# Patient Record
Sex: MALE | Race: ASIAN | URBAN - METROPOLITAN AREA
[De-identification: names, ages, dates, MRNs, and addresses within clinical notes are randomized per-mention and may not be internally consistent; named-entity substitution may affect disease eponyms.]

---

## 2023-12-28 ENCOUNTER — OFFICE VISIT (OUTPATIENT)
Facility: LOCATION | Age: 28
End: 2023-12-28
Payer: COMMERCIAL

## 2023-12-28 ENCOUNTER — LAB ENCOUNTER (OUTPATIENT)
Dept: LAB | Age: 28
End: 2023-12-28
Attending: INTERNAL MEDICINE
Payer: COMMERCIAL

## 2023-12-28 VITALS
OXYGEN SATURATION: 99 % | WEIGHT: 195 LBS | DIASTOLIC BLOOD PRESSURE: 72 MMHG | HEART RATE: 70 BPM | SYSTOLIC BLOOD PRESSURE: 122 MMHG

## 2023-12-28 DIAGNOSIS — E03.9 HYPOTHYROIDISM, UNSPECIFIED TYPE: ICD-10-CM

## 2023-12-28 DIAGNOSIS — E07.9 THYROID DISEASE: Primary | ICD-10-CM

## 2023-12-28 LAB
T4 FREE SERPL-MCNC: 1.3 NG/DL (ref 0.8–1.7)
TSI SER-ACNC: 18.01 MIU/ML (ref 0.55–4.78)

## 2023-12-28 PROCEDURE — 3078F DIAST BP <80 MM HG: CPT | Performed by: INTERNAL MEDICINE

## 2023-12-28 PROCEDURE — 99205 OFFICE O/P NEW HI 60 MIN: CPT | Performed by: INTERNAL MEDICINE

## 2023-12-28 PROCEDURE — 36415 COLL VENOUS BLD VENIPUNCTURE: CPT | Performed by: INTERNAL MEDICINE

## 2023-12-28 PROCEDURE — 3074F SYST BP LT 130 MM HG: CPT | Performed by: INTERNAL MEDICINE

## 2023-12-28 PROCEDURE — 84439 ASSAY OF FREE THYROXINE: CPT | Performed by: INTERNAL MEDICINE

## 2023-12-28 PROCEDURE — 84443 ASSAY THYROID STIM HORMONE: CPT | Performed by: INTERNAL MEDICINE

## 2023-12-28 RX ORDER — LEVOTHYROXINE SODIUM 175 UG/1
175 TABLET ORAL
Qty: 90 TABLET | Refills: 0 | Status: SHIPPED | OUTPATIENT
Start: 2023-12-28 | End: 2024-03-27

## 2023-12-28 RX ORDER — LEVOTHYROXINE SODIUM 0.15 MG/1
150 TABLET ORAL
Qty: 90 TABLET | Refills: 1 | Status: SHIPPED | OUTPATIENT
Start: 2023-12-28 | End: 2023-12-28

## 2023-12-28 RX ORDER — LEVOTHYROXINE SODIUM 0.15 MG/1
150 TABLET ORAL
COMMUNITY

## 2023-12-28 NOTE — PATIENT INSTRUCTIONS
Labs now - I will review labs and send higher doses 175 mcg if TSH is high. Do not miss doses. Take it as rec; . Ok to take at bedtime   Labs and f/u in 2 mo    Thyroid medication dose levothyroxine 150 mcg daily 0 ok to change to bed time  Take you medication on empty stomach, not with any other medication or food. Wait 60 minutes before eating. Wait 4 hours before taking Vitamins, Calcium or iron. Wait 60 minutes before eating. Do not take the medication on the morning of the lab test. Do not take Biotin/Turmeric 1 week before the test.    During pregnancy, we need to increase thyroid medication dose (take double your usual dose on 2 days a week, the rest of the days take the usual dose) and CALL US TO SCHEDULE AN APPT TO DO LABS AND ADJUST THE DOSE.      This is a very helpful website     [email or web address]

## 2023-12-28 NOTE — PROGRESS NOTES
New Patient Evaluation - History and Physical    CONSULT - Reason for Visit:  hypothyroid. Requesting Physician:   Nilay Henry       CHIEF COMPLAINT:    Chief Complaint   Patient presents with    Consult     Consult for TSH levels  Tired, sleepy, low energy, headaches and weight gain  (15 pounds in the last 8 moths)          HISTORY OF PRESENT ILLNESS:   Skeeter Soulier  is a 29year old male who presents with  hypothyroid       Dx with hypothyroid when was 7 yo   On LT4 150 mcg daily for ~ 2 yrs   He got  and has a child   He gained wt 40lb     He takes meds as rec     No OTC meds or supplement     Has headache, wt gain,   Cannot concentrate at work   + Fatigue   + loose stool     No anxiety , palpitations, tremors     Travelling so sleep cycle changed   No compressive  sx     He did labs at [facility]   TSH 31  12/2023    TSH 7    9/2023   TSH 2.1 8/2023  TSH  2.5 7/2023  TSH  23  6/2023     ASSESSMENT AND PLAN:  30 yo manw ith hypothyroid on LT4 150 mcg daily   Denied missing doses   He has nonspecific symptoms - headache, wt gain and fatigue   Outside labs on his phone showed TSH 23-->2-->7-->31 in the last 6 mo without changing in his doses  Discussed with pt hypothyroid and Rx   Pt was roomed again. He asked to send Rx of the higher dose since he feels hypothyroid symptoms. I explained the Rx will be sent but when I review his labs. I will reassess. Might need a different dose. Plan  Labs now - I will review labs and send a different dose of LT4   Sent Rx LT4 175 mcg now  Do not miss doses. Take it as rec; . Ok to take at bedtime   Labs and f/u in 2 mo  Thyroid medication dose levothyroxine 175 mcg daily ok to change to bed time. Take you medication on empty stomach, not with any other medication or food. Wait 60 minutes before eating. Wait 4 hours before taking Vitamins, Calcium or iron. Wait 60 minutes before eating.  Do not take the medication on the morning of the lab test. Do not take Biotin/Turmeric 1 week before the test.    During pregnancy, we need to increase thyroid medication dose (take double your usual dose on 2 days a week, the rest of the days take the usual dose) and CALL US TO SCHEDULE AN APPT TO DO LABS AND ADJUST THE DOSE. This is a very helpful website   [email or web address]      PAST MEDICAL HISTORY:   History reviewed. No pertinent past medical history. hypothyroid  PAST SURGICAL HISTORY:   History reviewed. No pertinent surgical history. None    CURRENT MEDICATIONS:    Current Outpatient Medications   Medication Sig Dispense Refill    levothyroxine 150 MCG Oral Tab Take 1 tablet (150 mcg total) by mouth every morning before breakfast.      levothyroxine 150 MCG Oral Tab Take 1 tablet (150 mcg total) by mouth before breakfast. 90 tablet 1       ALLERGIES:  Not on File    SOCIAL HISTORY:    Social History     Socioeconomic History    Marital status:    Tobacco Use    Smoking status: Never    Smokeless tobacco: Never   Vaping Use    Vaping Use: Never used   Substance and Sexual Activity    Alcohol use: Never    Drug use: Never       FAMILY HISTORY:   History reviewed. No pertinent family history. No hypothyroid  M with DM  No CAD or cancer     REVIEW OF SYSTEMS:  Back pain   All negative other than HPI      PHYSICAL EXAM:   Height: --  Weight: 195 lb (88.5 kg) (12/28 1451)  BSA (Calculated - sq m): --  Pulse: 70 (12/28 1451)  BP: 122/72 (12/28 1451)  Temp: --  Do Not Use - Resp Rate: --  SpO2: 99 % (12/28 1451)      No tremors   No goiter   CONSTITUTIONAL:  Awake and alert. Age appropriate, good hygiene not in acute distress. Well nourished and well developed. no acute distress   PSYCH:   Orientated to time, place, person & situation, Normal mood and affect, memory intact, normal insight and judgment, cooperative  Neuro: speech is clear.  Awake, alert, no aphasia, no facial asymmetry, no nuchal rigidity  EYES:  No proptosis, no ptosis, conjunctiva normal  ENT:  Normocephalic, atraumatic  Eye: EOMI, normal lids, no discharge, no conjunctival erythema. No exophthalmos/proptosis, Ptosis negative   No rhinorrhea, moist oral mucosa  Neck: full range of motion  Neck/Thyroid: neck inspection: normal, No scar, No goiter   LUNGS:  No acute respiratory distress, non-labored respiration. Speaking full sentences  CARDIOVASCULAR:  regular rate   ABDOMEN:  No abdominal pain. SKIN:  no bruising or bleeding, no rashes and no lesions, Skin is dry, no obvious rashes or lesions  acne  EXTREMITIES: no gross abnormality   MSK: Moves extremities spontaneously.  full range of motion in all major joints      DATA:     Pertinent data reviewed        Orders Placed This Encounter   Procedures    TSH and Free T4    TSH and Free T4     Orders Placed This Encounter    TSH and Free T4     Order Specific Question:   Release to patient     Answer:   Immediate    TSH and Free T4     Standing Status:   Future     Standing Expiration Date:   12/28/2024     Order Specific Question:   Release to patient     Answer:   Immediate    levothyroxine 150 MCG Oral Tab     Sig: Take 1 tablet (150 mcg total) by mouth every morning before breakfast.    levothyroxine 150 MCG Oral Tab     Sig: Take 1 tablet (150 mcg total) by mouth before breakfast.     Dispense:  90 tablet     Refill:  1        12/28/2023  Belle Loya MD

## 2024-01-09 ENCOUNTER — OFFICE VISIT (OUTPATIENT)
Facility: LOCATION | Age: 29
End: 2024-01-09
Payer: COMMERCIAL

## 2024-01-09 ENCOUNTER — LAB ENCOUNTER (OUTPATIENT)
Dept: LAB | Age: 29
End: 2024-01-09
Attending: INTERNAL MEDICINE
Payer: COMMERCIAL

## 2024-01-09 VITALS
OXYGEN SATURATION: 97 % | WEIGHT: 197 LBS | HEART RATE: 79 BPM | SYSTOLIC BLOOD PRESSURE: 130 MMHG | DIASTOLIC BLOOD PRESSURE: 68 MMHG

## 2024-01-09 DIAGNOSIS — E07.9 THYROID DISEASE: ICD-10-CM

## 2024-01-09 DIAGNOSIS — H93.19 TINNITUS, UNSPECIFIED LATERALITY: ICD-10-CM

## 2024-01-09 DIAGNOSIS — E03.9 HYPOTHYROIDISM, UNSPECIFIED TYPE: Primary | ICD-10-CM

## 2024-01-09 DIAGNOSIS — I10 HYPERTENSION, UNSPECIFIED TYPE: ICD-10-CM

## 2024-01-09 LAB
T4 FREE SERPL-MCNC: 1.5 NG/DL (ref 0.8–1.7)
TSI SER-ACNC: 4.54 MIU/ML (ref 0.55–4.78)

## 2024-01-09 PROCEDURE — 99214 OFFICE O/P EST MOD 30 MIN: CPT | Performed by: INTERNAL MEDICINE

## 2024-01-09 PROCEDURE — 36415 COLL VENOUS BLD VENIPUNCTURE: CPT | Performed by: INTERNAL MEDICINE

## 2024-01-09 PROCEDURE — 84439 ASSAY OF FREE THYROXINE: CPT | Performed by: INTERNAL MEDICINE

## 2024-01-09 PROCEDURE — 84443 ASSAY THYROID STIM HORMONE: CPT | Performed by: INTERNAL MEDICINE

## 2024-01-09 RX ORDER — LEVOTHYROXINE SODIUM 0.15 MG/1
150 TABLET ORAL
Qty: 90 TABLET | Refills: 0 | Status: SHIPPED | OUTPATIENT
Start: 2024-01-09 | End: 2024-04-08

## 2024-01-09 NOTE — PROGRESS NOTES
New Patient Evaluation - History and Physical    CONSULT - Reason for Visit:  hypothyroid.  Requesting Physician:   BRANDI Tony       CHIEF COMPLAINT:    Chief Complaint   Patient presents with    Consult     Consult for TSH levels  Tired, sleepy, low energy, headaches and weight gain  (15 pounds in the last 8 moths)          HISTORY OF PRESENT ILLNESS:   Hali Stark  is a 28 year old male who presents with  hypothyroid       TSH was 18 12/2023 so we increase LT4 from 150 to 175 mcg daily.   He started LT4 175 mcg daily on 12/29/2023.   Few days after starting the higher doses of LT4 he noticed migraine headache, sleeping more, brain fog,   He has to take more naps   He gets dizzy in daytime   He feels well at night     @ home Pulse is 88 and BP is 130s/80s  No tremors, or palpitation   No insomnia     Dx with hypothyroid when was 10 yo   He got  and has a child   He gained wt 40lb   He takes meds as rec     No OTC meds or supplement       Labs at [facility]   TSH 31  12/2023    TSH 7    9/2023   TSH 2.1 8/2023  TSH  2.5 7/2023  TSH  23  6/2023     ASSESSMENT AND PLAN:  29 yo man with hypothyroid   He was on LT4 150 mcg daily for ~ 2 yrs. TSH was high in Dec 2023. He has hyperthyroid sx with LT4 175 mcg daily.    Discussed with pt his labs, OTC supplement, different thyroid meds and symptoms. No dose in between 150 and 175   Will resume 150 mcg now and will avoid skipping doses.   Will do labs in ~ 6-8 weeks and follow up after that   Ok to get an appt sooner if needed     Plan  Ok to take thyroid medication at bedtime.   If you skip a dose, take x2 the dose next day  Labs today and I will review- TSH might be high still   Labs and f/u in 2 mo   Check BP at home if you have symptoms or x2/week  Thyroid medication dose levothyroxine 150 mcg daily   Take you medication on empty stomach, not with any other medication or food. Wait 60 minutes before eating. Wait 4 hours before taking  Vitamins, Calcium or iron.  Wait 60 minutes before eating. Do not take the medication on the morning of the lab test. Do not take Biotin/Turmeric 1 week before the test.      This is a very helpful website     [email or web address]      PAST MEDICAL HISTORY:   History reviewed. No pertinent past medical history.   hypothyroid  PAST SURGICAL HISTORY:   History reviewed. No pertinent surgical history.  None    CURRENT MEDICATIONS:    Current Outpatient Medications   Medication Sig Dispense Refill    levothyroxine 150 MCG Oral Tab Take 1 tablet (150 mcg total) by mouth every morning before breakfast.      levothyroxine 150 MCG Oral Tab Take 1 tablet (150 mcg total) by mouth before breakfast. 90 tablet 1       ALLERGIES:  Not on File    SOCIAL HISTORY:    Social History     Socioeconomic History    Marital status:    Tobacco Use    Smoking status: Never    Smokeless tobacco: Never   Vaping Use    Vaping Use: Never used   Substance and Sexual Activity    Alcohol use: Never    Drug use: Never       FAMILY HISTORY:   History reviewed. No pertinent family history.    No hypothyroid  M with DM  No CAD or cancer     REVIEW OF SYSTEMS:  Back pain   All negative other than HPI      PHYSICAL EXAM:   Height: --  Weight: 195 lb (88.5 kg) (12/28 1451)  BSA (Calculated - sq m): --  Pulse: 70 (12/28 1451)  BP: 122/72 (12/28 1451)  Temp: --  Do Not Use - Resp Rate: --  SpO2: 99 % (12/28 1451)      No tremors   No goiter   CONSTITUTIONAL:  Awake and alert. Age appropriate, good hygiene not in acute distress. Well nourished and well developed. no acute distress      Acne     DATA:     Pertinent data reviewed       Latest Reference Range & Units 12/28/23 15:36   T4,Free (Direct) 0.8 - 1.7 ng/dL 1.3   TSH 0.550 - 4.780 mIU/mL 18.007 (H)   (H): Data is abnormally high       1/9/2024    Lisa Ambrocio MD

## 2024-01-09 NOTE — PATIENT INSTRUCTIONS
Labs today   Labs and f/u in 2 mo   Check BP at home if you have symptoms or x2/week  Thyroid medication dose levothyroxine 150 mcg daily   Take you medication on empty stomach, not with any other medication or food. Wait 60 minutes before eating. Wait 4 hours before taking Vitamins, Calcium or iron.  Wait 60 minutes before eating. Do not take the medication on the morning of the lab test. Do not take Biotin/Turmeric 1 week before the test.      This is a very helpful website     [email or web address]

## 2024-01-25 ENCOUNTER — OFFICE VISIT (OUTPATIENT)
Facility: LOCATION | Age: 29
End: 2024-01-25
Payer: COMMERCIAL

## 2024-01-25 ENCOUNTER — LAB ENCOUNTER (OUTPATIENT)
Dept: LAB | Age: 29
End: 2024-01-25
Attending: INTERNAL MEDICINE
Payer: COMMERCIAL

## 2024-01-25 VITALS
WEIGHT: 198 LBS | SYSTOLIC BLOOD PRESSURE: 150 MMHG | HEART RATE: 90 BPM | OXYGEN SATURATION: 98 % | DIASTOLIC BLOOD PRESSURE: 80 MMHG

## 2024-01-25 DIAGNOSIS — R73.09 HIGH GLUCOSE LEVEL: ICD-10-CM

## 2024-01-25 DIAGNOSIS — R11.0 NAUSEA: ICD-10-CM

## 2024-01-25 DIAGNOSIS — E03.9 HYPOTHYROIDISM, UNSPECIFIED TYPE: Primary | ICD-10-CM

## 2024-01-25 DIAGNOSIS — E07.9 THYROID DISEASE: ICD-10-CM

## 2024-01-25 DIAGNOSIS — R79.89 LOW VITAMIN D LEVEL: ICD-10-CM

## 2024-01-25 DIAGNOSIS — E03.9 HYPOTHYROIDISM, UNSPECIFIED TYPE: ICD-10-CM

## 2024-01-25 DIAGNOSIS — R42 DIZZINESS: ICD-10-CM

## 2024-01-25 DIAGNOSIS — I10 HYPERTENSION, UNSPECIFIED TYPE: ICD-10-CM

## 2024-01-25 DIAGNOSIS — H93.19 TINNITUS, UNSPECIFIED LATERALITY: ICD-10-CM

## 2024-01-25 LAB
T4 FREE SERPL-MCNC: 1.1 NG/DL (ref 0.8–1.7)
TSI SER-ACNC: 27.01 MIU/ML (ref 0.55–4.78)

## 2024-01-25 PROCEDURE — 3077F SYST BP >= 140 MM HG: CPT | Performed by: INTERNAL MEDICINE

## 2024-01-25 PROCEDURE — 84443 ASSAY THYROID STIM HORMONE: CPT

## 2024-01-25 PROCEDURE — 3079F DIAST BP 80-89 MM HG: CPT | Performed by: INTERNAL MEDICINE

## 2024-01-25 PROCEDURE — 99214 OFFICE O/P EST MOD 30 MIN: CPT | Performed by: INTERNAL MEDICINE

## 2024-01-25 PROCEDURE — 36415 COLL VENOUS BLD VENIPUNCTURE: CPT

## 2024-01-25 PROCEDURE — 84439 ASSAY OF FREE THYROXINE: CPT

## 2024-01-25 NOTE — PATIENT INSTRUCTIONS
Labs today and will refer to Dr Prieto   Check BP at home twice and bring the log to Dr Prieto   Labs and f/u in 2 mo     Take   OTC vitamin D3 [id number] international unit daily.    Thyroid medication dose levothyroxine 150 mcg daily   Take you medication on empty stomach, not with any other medication or food. Wait 60 minutes before eating. Wait 4 hours before taking Vitamins, Calcium or iron.  Wait 60 minutes before eating. Do not take the medication on the morning of the lab test. Do not take Biotin/Turmeric 1 week before the test.

## 2024-01-25 NOTE — PROGRESS NOTES
Follow up - Reason for Visit:  hypothyroid.  Requesting Physician:   BRANDI Tony       CHIEF COMPLAINT:    Chief Complaint   Patient presents with    Consult     Consult for TSH levels  Tired, sleepy, low energy, headaches and weight gain  (15 pounds in the last 8 moths)          HISTORY OF PRESENT ILLNESS:   Hali Stark  is a 28 year old male who presents with  hypothyroid         TSH was 18 12/2023 so we increase LT4 from 150 to 175 mcg daily.   He started LT4 175 mcg daily on 12/29/2023.   Recent labs 1/9/2024 TSH 4.5     BP at home 130s/80s  HR sometimes > 100 but for ~1-2 min  He feels 150 is high for him. He takes it as rec'   We discussed the dose changes ( Was on 50 up to 200 before) but was missing doses. Now takes med correctly so maybe needs lower doses     Still gets short episodes of change in vision, ringing in ears, headache, sob   No tremors, but has palpitation       Dx with hypothyroid when was 10 yo   He got  and has a child   He gained wt 40lb   He takes meds as rec     No OTC meds or supplement       Labs at [facility]   TSH 31  12/2023    TSH 7    9/2023   TSH 2.1 8/2023  TSH  2.5 7/2023  TSH  23  6/2023        Latest Reference Range & Units 12/28/23 15:36 01/09/24 14:49   T4,Free (Direct) 0.8 - 1.7 ng/dL 1.3 1.5   TSH 0.550 - 4.780 mIU/mL 18.007 (H) 4.538         ASSESSMENT AND PLAN:  27 yo man with hypothyroid   He was on LT4 150 mcg daily for ~ 2 yrs. TSH was high in Dec 2023.   He had hyperthyroid sx with LT4 175 mcg daily.   He has nonspecific symptoms.   We resumed 150 mcg now. He takes meds as rec' and not missing doses.       Recent labs showed normal TFT   He does not have PCP so will refer to Dr Prieto   Discussed with pt his labs, OTC supplement, different thyroid meds and symptoms.           Plan  Labs today and will refer to Dr Prieto   Check BP at home twice and bring the log to Dr Prieto   Labs and f/u in 2 mo     Take   OTC vitamin D3 [id number]  international unit daily.    Thyroid medication dose levothyroxine 150 mcg daily   Take you medication on empty stomach, not with any other medication or food. Wait 60 minutes before eating. Wait 4 hours before taking Vitamins, Calcium or iron.  Wait 60 minutes before eating. Do not take the medication on the morning of the lab test. Do not take Biotin/Turmeric 1 week before the test.        PAST MEDICAL HISTORY:   History reviewed. No pertinent past medical history.   hypothyroid  PAST SURGICAL HISTORY:   History reviewed. No pertinent surgical history.  None    CURRENT MEDICATIONS:    Current Outpatient Medications   Medication Sig Dispense Refill    levothyroxine 150 MCG Oral Tab Take 1 tablet (150 mcg total) by mouth every morning before breakfast.      levothyroxine 150 MCG Oral Tab Take 1 tablet (150 mcg total) by mouth before breakfast. 90 tablet 1       ALLERGIES:  Not on File    SOCIAL HISTORY:    Social History     Socioeconomic History    Marital status:    Tobacco Use    Smoking status: Never    Smokeless tobacco: Never   Vaping Use    Vaping Use: Never used   Substance and Sexual Activity    Alcohol use: Never    Drug use: Never       FAMILY HISTORY:   History reviewed. No pertinent family history.    No hypothyroid  M with DM  No CAD or cancer     REVIEW OF SYSTEMS:  Back pain   All negative other than HPI      PHYSICAL EXAM:   Height: --  Weight: 195 lb (88.5 kg) (12/28 1451)  BSA (Calculated - sq m): --  Pulse: 70 (12/28 1451)  BP: 122/72 (12/28 1451)  Temp: --  Do Not Use - Resp Rate: --  SpO2: 99 % (12/28 1451)      No tremors   No goiter   CONSTITUTIONAL:  Awake and alert. Age appropriate, good hygiene not in acute distress. Well nourished and well developed. no acute distress      Acne     DATA:     Pertinent data reviewed       Latest Reference Range & Units 12/28/23 15:36   T4,Free (Direct) 0.8 - 1.7 ng/dL 1.3   TSH 0.550 - 4.780 mIU/mL 18.007 (H)   (H): Data is abnormally high            Lisa Ambrocio MD